# Patient Record
Sex: FEMALE | Race: OTHER | HISPANIC OR LATINO | URBAN - METROPOLITAN AREA
[De-identification: names, ages, dates, MRNs, and addresses within clinical notes are randomized per-mention and may not be internally consistent; named-entity substitution may affect disease eponyms.]

---

## 2018-01-07 ENCOUNTER — EMERGENCY (EMERGENCY)
Facility: HOSPITAL | Age: 68
LOS: 1 days | Discharge: HOME | End: 2018-01-07
Attending: EMERGENCY MEDICINE
Payer: COMMERCIAL

## 2018-01-07 VITALS
HEIGHT: 60 IN | WEIGHT: 125 LBS | RESPIRATION RATE: 20 BRPM | OXYGEN SATURATION: 95 % | SYSTOLIC BLOOD PRESSURE: 146 MMHG | TEMPERATURE: 99 F | HEART RATE: 72 BPM | DIASTOLIC BLOOD PRESSURE: 79 MMHG

## 2018-01-07 PROCEDURE — 99284 EMERGENCY DEPT VISIT MOD MDM: CPT

## 2018-01-07 RX ORDER — CLARITHROMYCIN 500 MG
1 TABLET ORAL
Qty: 1 | Refills: 0 | OUTPATIENT
Start: 2018-01-07

## 2018-01-07 RX ORDER — ALBUTEROL 90 UG/1
2 AEROSOL, METERED ORAL
Qty: 1 | Refills: 0 | OUTPATIENT
Start: 2018-01-07 | End: 2018-02-05

## 2018-01-07 NOTE — ED ADULT NURSE NOTE - OBJECTIVE STATEMENT
pt from home c/o of generalized bodyache with fever and cough x5 days pt is alert awake no acute respiratory distress noted

## 2021-09-30 PROBLEM — I10 ESSENTIAL (PRIMARY) HYPERTENSION: Chronic | Status: ACTIVE | Noted: 2018-01-07

## 2021-10-01 ENCOUNTER — APPOINTMENT (OUTPATIENT)
Dept: GASTROENTEROLOGY | Facility: CLINIC | Age: 71
End: 2021-10-01
Payer: MEDICARE

## 2021-10-01 VITALS
HEART RATE: 76 BPM | TEMPERATURE: 97.3 F | WEIGHT: 107 LBS | HEIGHT: 58 IN | BODY MASS INDEX: 22.46 KG/M2 | SYSTOLIC BLOOD PRESSURE: 131 MMHG | DIASTOLIC BLOOD PRESSURE: 74 MMHG | OXYGEN SATURATION: 97 %

## 2021-10-01 DIAGNOSIS — R10.13 EPIGASTRIC PAIN: ICD-10-CM

## 2021-10-01 DIAGNOSIS — Z12.11 ENCOUNTER FOR SCREENING FOR MALIGNANT NEOPLASM OF COLON: ICD-10-CM

## 2021-10-01 DIAGNOSIS — R63.4 ABNORMAL WEIGHT LOSS: ICD-10-CM

## 2021-10-01 DIAGNOSIS — Z01.818 ENCOUNTER FOR OTHER PREPROCEDURAL EXAMINATION: ICD-10-CM

## 2021-10-01 PROBLEM — Z00.00 ENCOUNTER FOR PREVENTIVE HEALTH EXAMINATION: Status: ACTIVE | Noted: 2021-10-01

## 2021-10-01 PROCEDURE — 99204 OFFICE O/P NEW MOD 45 MIN: CPT

## 2021-10-01 RX ORDER — POLYETHYLENE GLYCOL 3350 AND ELECTROLYTES WITH LEMON FLAVOR 236; 22.74; 6.74; 5.86; 2.97 G/4L; G/4L; G/4L; G/4L; G/4L
236 POWDER, FOR SOLUTION ORAL
Qty: 1 | Refills: 0 | Status: ACTIVE | COMMUNITY
Start: 2021-10-01 | End: 1900-01-01

## 2021-10-01 NOTE — HISTORY OF PRESENT ILLNESS
[None] : had no significant interval events [Heartburn] : denies heartburn [Nausea] : denies nausea [Vomiting] : denies vomiting [Diarrhea] : denies diarrhea [Constipation] : denies constipation [Yellow Skin Or Eyes (Jaundice)] : denies jaundice [Abdominal Pain] : denies abdominal pain [Rectal Pain] : denies rectal pain [Wt Loss ___ Lbs] : recent [unfilled] ~Upound(s) weight loss [Abdominal Swelling] : abdominal swelling [Wt Gain ___ Lbs] : no recent weight gain [GERD] : no gastroesophageal reflux disease [Hiatus Hernia] : no hiatus hernia [Peptic Ulcer Disease] : no peptic ulcer disease [Pancreatitis] : no pancreatitis [Cholelithiasis] : no cholelithiasis [Kidney Stone] : no kidney stone [Inflammatory Bowel Disease] : no inflammatory bowel disease [Irritable Bowel Syndrome] : no irritable bowel syndrome [Diverticulitis] : no diverticulitis [Alcohol Abuse] : no alcohol abuse [Malignancy] : no malignancy [Abdominal Surgery] : no abdominal surgery [Appendectomy] : no appendectomy [Cholecystectomy] : no cholecystectomy [de-identified] : The patient is a 71-year-old  female with past medical history significant for hypertension and hypercholesterolemia who was referred to my office by Dr. Jose Graham for dyspepsia. The patient also admits to having recent weight loss.  The patient also admits to coming to the office for colon cancer screening.  I was asked to render an opinion for consultation for the above complaints.   The patient states that she is feeling fine.  The patient denies any abdominal pain.  The patient complains of abdominal gas and bloating.  The patient denies any nausea or vomiting.  The patient denies any gastroesophageal reflux disease or dysphagia. The patient denies any atypical chest pain, shortness of breath or palpitations.  The patient denies any diaphoresis. The patient denies any constipation or diarrhea.  The patient has 1 to 2 bowel movements a day.  The patient denies a change in bowel habits.  The patient denies a change in caliber of stool.  The patient denies having mucus discharge with the bowel movements.  The patient denies any bright red blood per rectum, melena or hematemesis.  The patient complains of rectal pruritus but denies any rectal pain.  The patient complains of weight loss but denies any anorexia.  The patient admits to losing 13 pounds over the past 1 year.  The patient attributes the weight loss to recent stress from COVID.  She denies any fevers or chills.  The patient denies any jaundice or pruritus.  The patient denies any back pain.  The patient admits to having a prior colonoscopy performed by another gastroenterologist.  According to the patient, the colonoscopic findings revealed a poor prep.   The patient's last menstrual period was age 46. The patient is a .  The patient's first menstrual period was at age 15. The patient denies any significant family history of GI problems.   [de-identified] : (-) smoking, (-) ETOH, (-) IVDA\par

## 2021-10-01 NOTE — REVIEW OF SYSTEMS
[Feeling Tired] : feeling tired [Eyesight Problems] : eyesight problems [Joint Swelling] : joint swelling [Joint Stiffness] : joint stiffness [Itching] : itching [Anxiety] : anxiety [Negative] : Heme/Lymph

## 2021-10-01 NOTE — ASSESSMENT
[FreeTextEntry1] : Dyspepsia: The patient complains of dyspeptic symptoms.  The patient was advised to abide by an anti-gas diet.  The patient was given a pamphlet for anti-gas.  The patient and I reviewed the anti-gas diet at length. The patient is to start on a trial of Phazyme one tablet 3 times a day p.r.n. abdominal pain and gas.\par Weight Loss: The patient complains of weight loss but denies any anorexia.  The patient admits to losing 13 pounds over the past 1 year.  The patient attributes the weight loss to recent stress from COVID. \par Colon Cancer screening: I recommend colonoscopy for colon cancer screening over the age of 45 to assess for colonic polyps. The patient was told of the risks and benefits of the procedure.  The patient was told of the risks of perforation, emergency surgery, bleeding, infections and missed lesions. The patient agreed and will schedule for the procedure. The patient is to be n.p.o. after midnight and bowel prep was given.  The patient is to return for the procedure. \par Colonoscopy: I recommend a colonoscopy to assess the symptoms.  The patient was told of the risks and benefits of the procedure.  The patient was told of the risks of perforation, emergency surgery, bleeding, infections and missed lesions.  The patient agreed and will schedule for the procedure. The patient can take the antihypertensive medication with a sip of water one hour prior to the procedure.  The patient is to be n.p.o. after midnight and bowel prep was given.  The patient is to return for the procedure. \par Follow-up: The patient is to follow-up in the office in 4 weeks to reassess the symptoms. The patient was told to call the office if any further problems. \par

## 2021-10-04 LAB — HEMOCCULT STL QL: NEGATIVE

## 2021-11-17 ENCOUNTER — APPOINTMENT (OUTPATIENT)
Dept: DISASTER EMERGENCY | Facility: CLINIC | Age: 71
End: 2021-11-17

## 2021-11-17 ENCOUNTER — LABORATORY RESULT (OUTPATIENT)
Age: 71
End: 2021-11-17

## 2021-11-20 ENCOUNTER — APPOINTMENT (OUTPATIENT)
Dept: DISASTER EMERGENCY | Facility: CLINIC | Age: 71
End: 2021-11-20

## 2021-11-20 DIAGNOSIS — Z01.818 ENCOUNTER FOR OTHER PREPROCEDURAL EXAMINATION: ICD-10-CM

## 2021-11-21 LAB — SARS-COV-2 N GENE NPH QL NAA+PROBE: NOT DETECTED

## 2021-11-23 ENCOUNTER — OUTPATIENT (OUTPATIENT)
Dept: OUTPATIENT SERVICES | Facility: HOSPITAL | Age: 71
LOS: 1 days | End: 2021-11-23
Payer: COMMERCIAL

## 2021-11-23 ENCOUNTER — APPOINTMENT (OUTPATIENT)
Dept: GASTROENTEROLOGY | Facility: HOSPITAL | Age: 71
End: 2021-11-23

## 2021-11-23 DIAGNOSIS — Z12.11 ENCOUNTER FOR SCREENING FOR MALIGNANT NEOPLASM OF COLON: ICD-10-CM

## 2021-11-23 PROCEDURE — G0121: CPT

## 2021-11-23 PROCEDURE — G0121 COLON CA SCRN NOT HI RSK IND: CPT

## 2024-09-05 ENCOUNTER — EMERGENCY (EMERGENCY)
Facility: HOSPITAL | Age: 74
LOS: 1 days | Discharge: HOME | End: 2024-09-05
Attending: EMERGENCY MEDICINE
Payer: COMMERCIAL

## 2024-09-05 VITALS
WEIGHT: 119.93 LBS | RESPIRATION RATE: 17 BRPM | TEMPERATURE: 98 F | DIASTOLIC BLOOD PRESSURE: 80 MMHG | HEIGHT: 62 IN | SYSTOLIC BLOOD PRESSURE: 139 MMHG | HEART RATE: 80 BPM | OXYGEN SATURATION: 97 %

## 2024-09-05 DIAGNOSIS — Z98.891 HISTORY OF UTERINE SCAR FROM PREVIOUS SURGERY: Chronic | ICD-10-CM

## 2024-09-05 LAB
ALBUMIN SERPL ELPH-MCNC: 3.8 G/DL — SIGNIFICANT CHANGE UP (ref 3.5–5)
ALP SERPL-CCNC: 144 U/L — HIGH (ref 40–120)
ALT FLD-CCNC: 38 U/L DA — SIGNIFICANT CHANGE UP (ref 10–60)
ANION GAP SERPL CALC-SCNC: 8 MMOL/L — SIGNIFICANT CHANGE UP (ref 5–17)
APPEARANCE UR: CLEAR — SIGNIFICANT CHANGE UP
AST SERPL-CCNC: 42 U/L — HIGH (ref 10–40)
BACTERIA # UR AUTO: ABNORMAL /HPF
BASOPHILS # BLD AUTO: 0.04 K/UL — SIGNIFICANT CHANGE UP (ref 0–0.2)
BASOPHILS NFR BLD AUTO: 0.7 % — SIGNIFICANT CHANGE UP (ref 0–2)
BILIRUB SERPL-MCNC: 0.7 MG/DL — SIGNIFICANT CHANGE UP (ref 0.2–1.2)
BILIRUB UR-MCNC: NEGATIVE — SIGNIFICANT CHANGE UP
BUN SERPL-MCNC: 19 MG/DL — HIGH (ref 7–18)
CALCIUM SERPL-MCNC: 10 MG/DL — SIGNIFICANT CHANGE UP (ref 8.4–10.5)
CHLORIDE SERPL-SCNC: 98 MMOL/L — SIGNIFICANT CHANGE UP (ref 96–108)
CO2 SERPL-SCNC: 27 MMOL/L — SIGNIFICANT CHANGE UP (ref 22–31)
COLOR SPEC: YELLOW — SIGNIFICANT CHANGE UP
COMMENT - URINE: SIGNIFICANT CHANGE UP
CREAT SERPL-MCNC: 0.83 MG/DL — SIGNIFICANT CHANGE UP (ref 0.5–1.3)
DIFF PNL FLD: NEGATIVE — SIGNIFICANT CHANGE UP
EGFR: 74 ML/MIN/1.73M2 — SIGNIFICANT CHANGE UP
EOSINOPHIL # BLD AUTO: 0.19 K/UL — SIGNIFICANT CHANGE UP (ref 0–0.5)
EOSINOPHIL NFR BLD AUTO: 3.4 % — SIGNIFICANT CHANGE UP (ref 0–6)
EPI CELLS # UR: PRESENT
GLUCOSE SERPL-MCNC: 144 MG/DL — HIGH (ref 70–99)
GLUCOSE UR QL: NEGATIVE MG/DL — SIGNIFICANT CHANGE UP
HCT VFR BLD CALC: 34.7 % — SIGNIFICANT CHANGE UP (ref 34.5–45)
HGB BLD-MCNC: 12 G/DL — SIGNIFICANT CHANGE UP (ref 11.5–15.5)
HIV 1 & 2 AB SERPL IA.RAPID: SIGNIFICANT CHANGE UP
IMM GRANULOCYTES NFR BLD AUTO: 2 % — HIGH (ref 0–0.9)
KETONES UR-MCNC: NEGATIVE MG/DL — SIGNIFICANT CHANGE UP
LEUKOCYTE ESTERASE UR-ACNC: ABNORMAL
LIDOCAIN IGE QN: 29 U/L — SIGNIFICANT CHANGE UP (ref 13–75)
LYMPHOCYTES # BLD AUTO: 1.93 K/UL — SIGNIFICANT CHANGE UP (ref 1–3.3)
LYMPHOCYTES # BLD AUTO: 34.9 % — SIGNIFICANT CHANGE UP (ref 13–44)
MAGNESIUM SERPL-MCNC: 1.9 MG/DL — SIGNIFICANT CHANGE UP (ref 1.6–2.6)
MCHC RBC-ENTMCNC: 32.3 PG — SIGNIFICANT CHANGE UP (ref 27–34)
MCHC RBC-ENTMCNC: 34.6 GM/DL — SIGNIFICANT CHANGE UP (ref 32–36)
MCV RBC AUTO: 93.3 FL — SIGNIFICANT CHANGE UP (ref 80–100)
MONOCYTES # BLD AUTO: 0.69 K/UL — SIGNIFICANT CHANGE UP (ref 0–0.9)
MONOCYTES NFR BLD AUTO: 12.5 % — SIGNIFICANT CHANGE UP (ref 2–14)
NEUTROPHILS # BLD AUTO: 2.57 K/UL — SIGNIFICANT CHANGE UP (ref 1.8–7.4)
NEUTROPHILS NFR BLD AUTO: 46.5 % — SIGNIFICANT CHANGE UP (ref 43–77)
NITRITE UR-MCNC: NEGATIVE — SIGNIFICANT CHANGE UP
NRBC # BLD: 0 /100 WBCS — SIGNIFICANT CHANGE UP (ref 0–0)
PH UR: 8 — SIGNIFICANT CHANGE UP (ref 5–8)
PHOSPHATE SERPL-MCNC: 2.6 MG/DL — SIGNIFICANT CHANGE UP (ref 2.5–4.5)
PLATELET # BLD AUTO: 146 K/UL — LOW (ref 150–400)
POTASSIUM SERPL-MCNC: 3.7 MMOL/L — SIGNIFICANT CHANGE UP (ref 3.5–5.3)
POTASSIUM SERPL-SCNC: 3.7 MMOL/L — SIGNIFICANT CHANGE UP (ref 3.5–5.3)
PROT SERPL-MCNC: 7 G/DL — SIGNIFICANT CHANGE UP (ref 6–8.3)
PROT UR-MCNC: NEGATIVE MG/DL — SIGNIFICANT CHANGE UP
RBC # BLD: 3.72 M/UL — LOW (ref 3.8–5.2)
RBC # FLD: 12.8 % — SIGNIFICANT CHANGE UP (ref 10.3–14.5)
RBC CASTS # UR COMP ASSIST: 1 /HPF — SIGNIFICANT CHANGE UP (ref 0–4)
SODIUM SERPL-SCNC: 133 MMOL/L — LOW (ref 135–145)
SP GR SPEC: 1.01 — SIGNIFICANT CHANGE UP (ref 1–1.03)
TROPONIN I, HIGH SENSITIVITY RESULT: 48.7 NG/L — SIGNIFICANT CHANGE UP
UROBILINOGEN FLD QL: 1 MG/DL — SIGNIFICANT CHANGE UP (ref 0.2–1)
WBC # BLD: 5.53 K/UL — SIGNIFICANT CHANGE UP (ref 3.8–10.5)
WBC # FLD AUTO: 5.53 K/UL — SIGNIFICANT CHANGE UP (ref 3.8–10.5)
WBC UR QL: 6 /HPF — HIGH (ref 0–5)

## 2024-09-05 PROCEDURE — 81001 URINALYSIS AUTO W/SCOPE: CPT

## 2024-09-05 PROCEDURE — 83690 ASSAY OF LIPASE: CPT

## 2024-09-05 PROCEDURE — 93010 ELECTROCARDIOGRAM REPORT: CPT | Mod: 76

## 2024-09-05 PROCEDURE — 84484 ASSAY OF TROPONIN QUANT: CPT

## 2024-09-05 PROCEDURE — 99285 EMERGENCY DEPT VISIT HI MDM: CPT

## 2024-09-05 PROCEDURE — 86803 HEPATITIS C AB TEST: CPT

## 2024-09-05 PROCEDURE — 85025 COMPLETE CBC W/AUTO DIFF WBC: CPT

## 2024-09-05 PROCEDURE — 96374 THER/PROPH/DIAG INJ IV PUSH: CPT

## 2024-09-05 PROCEDURE — 80053 COMPREHEN METABOLIC PANEL: CPT

## 2024-09-05 PROCEDURE — 99284 EMERGENCY DEPT VISIT MOD MDM: CPT | Mod: 25

## 2024-09-05 PROCEDURE — 36415 COLL VENOUS BLD VENIPUNCTURE: CPT

## 2024-09-05 PROCEDURE — 83735 ASSAY OF MAGNESIUM: CPT

## 2024-09-05 PROCEDURE — T1013: CPT

## 2024-09-05 PROCEDURE — 93005 ELECTROCARDIOGRAM TRACING: CPT

## 2024-09-05 PROCEDURE — 84100 ASSAY OF PHOSPHORUS: CPT

## 2024-09-05 PROCEDURE — 86703 HIV-1/HIV-2 1 RESULT ANTBDY: CPT

## 2024-09-05 RX ORDER — IBUPROFEN 600 MG
1 TABLET ORAL
Qty: 20 | Refills: 0
Start: 2024-09-05 | End: 2024-09-09

## 2024-09-05 RX ORDER — KETOROLAC TROMETHAMINE 30 MG/ML
15 INJECTION, SOLUTION INTRAMUSCULAR ONCE
Refills: 0 | Status: DISCONTINUED | OUTPATIENT
Start: 2024-09-05 | End: 2024-09-05

## 2024-09-05 RX ORDER — LIDOCAINE/BENZALKONIUM/ALCOHOL
1 SOLUTION, NON-ORAL TOPICAL
Qty: 20 | Refills: 0
Start: 2024-09-05 | End: 2024-09-09

## 2024-09-05 RX ORDER — CYCLOBENZAPRINE HCL 10 MG
1 TABLET ORAL
Qty: 20 | Refills: 0
Start: 2024-09-05 | End: 2024-09-09

## 2024-09-05 RX ORDER — CYCLOBENZAPRINE HCL 10 MG
10 TABLET ORAL ONCE
Refills: 0 | Status: COMPLETED | OUTPATIENT
Start: 2024-09-05 | End: 2024-09-05

## 2024-09-05 RX ADMIN — Medication 10 MILLIGRAM(S): at 21:35

## 2024-09-05 RX ADMIN — Medication 10 MILLIGRAM(S): at 20:05

## 2024-09-05 RX ADMIN — KETOROLAC TROMETHAMINE 15 MILLIGRAM(S): 30 INJECTION, SOLUTION INTRAMUSCULAR at 20:06

## 2024-09-05 NOTE — ED PROVIDER NOTE - OBJECTIVE STATEMENT
74 female with hx of HTN.   Patient presenting to the ED reporting diffuse back pains for the past 3 days.  Patient reports pain started after doing a lot of housework.  No trauma.

## 2024-09-05 NOTE — ED PROVIDER NOTE - NS ED ROS FT
Constitutional: (-) fever (-) chills  HENT: (-) congestion (-) rhinorrhea (-) sore throat  Eyes: (-) pain (-) redness  Respiratory: (-) cough (-) shortness of breath (-) wheezing (-) stridor  Cardiovascular: (-) chest pain (-) palpitations (-) leg swelling  Gastrointestinal: (-) abdominal pain (-) blood in stool (no melena/hematochezia) (-) diarrhea (-) vomiting  Genitourinary: (-) dysuria (-) hematuria (-) incontinence/retention   Musculoskeletal: (-) gait problem (-) joint swelling (-) myalgias (+) diffuse back pain  Skin: (-) color change (-) rash  Neurological: (-) weakness (-) numbness (-) headaches (-) saddle anesthesia   Psychiatric/Behavioral: (-) confusion

## 2024-09-05 NOTE — ED PROVIDER NOTE - PROGRESS NOTE DETAILS
Results reviewed.  Patient reports feeling better.  Nonfocal neuro. Stable gait.  Patient/Family advised regarding symptomatic/supportive care, importance of outpatient follow up, and symptoms to prompt ED return.

## 2024-09-05 NOTE — ED PROVIDER NOTE - PATIENT PORTAL LINK FT
You can access the FollowMyHealth Patient Portal offered by [facility] by registering at the following website: [email or web address]. By joining [facility]’s FollowMyHealth portal, you will also be able to view your health information using other applications (apps) compatible with our system.

## 2024-09-05 NOTE — ED PROVIDER NOTE - NSFOLLOWUPINSTRUCTIONS_ED_ALL_ED_FT
Please follow up with your PMD or Medicine Clinic in 2-3 days.  Return to the ER for worsening or concerning symptoms.  Your results for testing will be available in the Follow My Health application which can be downloaded to your phone or checked online on a computer.  [email or web address].  Take Acetaminophen (Tylenol) 650mg every 6 hours as needed for pain or fever.  Take Ibuprofen (Advil or Motrin) 400mg every 6 hours as needed for pain or fever with food.  Take Cyclobenzaprine (Flexeril) 10mg every 8 hours as needed for pain/muscle spasm. This medication can be sedating. Do not mix sedatives, drink alcohol, or operative car/heavy machinery when using this medication.  - - - - - - - - - - - - -  Por favor, consulte con alcaraz médico de cabecera o con alcaraz clínica de medicina en 2-3 días.  Regrese a la jaxon de emergencias si los síntomas empeoran o son preocupantes.  Los resultados de angelica pruebas estarán disponibles en la aplicación [employer], que puede descargarse a alcaraz teléfono o consultarse en línea en toshia computadora.  [email or web address].  Axis acetaminofeno (Tylenol) 650 mg cada 6 horas según sea necesario para el dolor o la fiebre.  Axis ibuprofeno (Advil o Motrin) 400 mg cada 6 horas según sea necesario para el dolor o la fiebre con alimentos.  Axis ciclobenzaprina (Flexeril) 10 mg cada 8 horas según sea necesario para el dolor/espasmo muscular. Rochelle medicamento puede ser sedante. No mezcle sedantes, no mariaelena alcohol ni conduzca un automóvil o maquinaria pesada cuando use rochelle medicamento.  - - - - - - - - - - - - -  Dolor de espalda ramón en los adultos  Acute Back Pain, Adult  El dolor de espalda ramón es repentino y por lo general no dura mucho tiempo. Se debe generalmente a toshia lesión de los músculos y tejidos de la espalda. La lesión puede ser el resultado de:  Estiramiento en exceso o desgarro de un músculo, tendón o ligamento. Los ligamentos son tejidos que conectan los huesos. Levantar algo de forma incorrecta puede producir un esguince de espalda.  Desgaste (degeneración) de los discos vertebrales. Los discos vertebrales son tejidos circulares que proporcionan amortiguación entre los huesos de la columna vertebral (vértebras).  Movimientos de giro, kaylee al practicar deportes o realizar trabajos de jardinería.  Un golpe en la espalda.  Artritis.  Es posible que le realicen un examen físico, análisis de laboratorio u otros estudios de diagnóstico por imágenes para encontrar la causa del dolor. El dolor de espalda ramón generalmente desaparece con reposo y cuidados en la casa.    Siga estas instrucciones en alcaraz casa:  Control del dolor, la rigidez y la hinchazón    Use los medicamentos de venta gumaro y los recetados solamente kaylee se lo haya indicado el médico. El tratamiento puede incluir medicamentos para el dolor y la inflamación que se natacha por la boca o que se aplican sobre la piel, o relajantes musculares.  El médico puede recomendarle que se aplique hielo rtana las primeras 24 a 48 horas después del comienzo del dolor. Para hacer esto:  Ponga el hielo en toshia bolsa plástica.  Coloque toshia toalla entre la piel y la bolsa.  Aplique el hielo ratna 20 minutos, 2 o 3 veces por día.  Retire el hielo si la piel se pone de color garcia brillante. [place] es muy importante. Si no puede sentir dolor, calor o frío, tiene un mayor riesgo de que se dañe la jerardo.  Si se lo indican, aplique calor en la jerardo afectada con la frecuencia que le haya indicado el médico. Use la katelin de calor que el médico le recomiende, kaylee toshia compresa de calor húmedo o toshia almohadilla térmica.  Coloque toshia toalla entre la piel y la katelin de calor.  Aplique calor ratna 20 a 30 minutos.  Retire la katelin de calor si la piel se pone de color garcia brillante. [place] es especialmente importante si no puede sentir dolor, calor o frío. Corre un mayor riesgo de sufrir quemaduras.  Actividad    Comparisons of good and bad posture while driving, standing, sitting at a desk, and lifting heavy objects.  No permanezca en la cama. Hacer reposo en la cama por más de 1 a 2 días puede demorar alcaraz recuperación.  Mantenga toshia buena postura al sentarse y pararse. No se incline hacia adelante al sentarse ni se encorve al pararse.  Si trabaja en un escritorio, siéntese cerca de rochelle para no tener que inclinarse. Mantenga el mentón hacia abajo. Mantenga el mimi hacia atrás y los codos flexionados en un ángulo de 90 grados (ángulo recto).  Cuando conduzca, siéntese elevado y cerca del volante. Agregue un apoyo para la espalda (lumbar) al asiento del automóvil, si es necesario.  Realice caminatas cortas en superficies jaime tan pronto kaylee le sea posible. Trate de caminar un poco más de tiempo cada día.  No se siente, conduzca o permanezca de pie en un mismo lugar ratna más de 30 minutos seguidos. Pararse o sentarse ratna largos períodos de tiempo puede sobrecargar la espalda.  No conduzca ni use maquinaria pesada mientras zully analgésicos recetados.  Use técnicas apropiadas para levantar objetos. Cuando se inclina y levanta un objeto, utilice posiciones que no sobrecarguen tanto la espalda:  Flexione las rodillas.  Mantenga la carga cerca del cuerpo.  No se tuerza.  Amador actividad física habitualmente kaylee se lo haya indicado el médico. Hacer ejercicios ayuda a que la espalda sane más rápido y ayuda a evitar las lesiones de la espalda al mantener los músculos claritza y flexibles.  Trabaje con un fisioterapeuta para crear un programa de ejercicios seguros, según lo recomiende el médico. Amador ejercicios kaylee se lo haya indicado el fisioterapeuta.  Estilo de elijah    Mantenga un peso saludable. El sobrepeso sobrecarga la espalda y hace que resulte difícil tener toshia buena postura.  Evite actividades o situaciones que lo deanna sentirse ansioso o estresado. El estrés y la ansiedad aumentan la tensión muscular y pueden empeorar el dolor de espalda. Aprenda formas de manejar la ansiedad y el estrés, kaylee a través del ejercicio.  Instrucciones generales    Duerma sobre un colchón firme en toshia posición cómoda. Intente acostarse de costado, con las rodillas ligeramente flexionadas. Si se recuesta sobre la espalda, coloque toshia almohada debajo de las rodillas.  Mantenga la kiran y el mimi en línea recta con la columna vertebral (posición neutra) cuando use equipos electrónicos kaylee teléfonos inteligentes o tablets. Para hacer esto:  Levante el teléfono inteligente o la tablet para mirarlo en lugar de inclinar la kiran o el mimi para mirar hacia abajo.  Coloque el teléfono inteligente o la tablet al nivel de alcaraz bertin mientras chris la pantalla.  Siga el plan de tratamiento kaylee se lo haya indicado el médico. Crossgate puede incluir:  Terapia cognitiva o conductual.  Acupuntura o terapia de masajes.  Yoga o meditación.  Comuníquese con un médico si:  Siente un dolor que no se amalia con reposo o medicamentos.  Siente mucho dolor que se extiende a las piernas o las nalgas.  El dolor no mejora luego de 2 semanas.  Siente dolor por la noche.  Pierde peso sin proponérselo.  Tiene fiebre o escalofríos.  Siente náuseas o vómitos.  Siente dolor abdominal.  Solicite ayuda de inmediato si:  Tiene nuevos problemas para controlar la vejiga o los intestinos.  Siente debilidad o adormecimiento inusuales en los brazos o en las piernas.  Siente que va a desmayarse.  Estos síntomas pueden representar un problema grave que constituye toshia emergencia. No espere a myles si los síntomas desaparecen. Solicite atención médica de inmediato. Comuníquese con el servicio de emergencias de alcaraz localidad (911 en los [place]). No conduzca por angelica propios medios hasta el hospital.    Resumen  El dolor de espalda ramón es repentino y por lo general no dura mucho tiempo.  Use técnicas apropiadas para levantar objetos. Cuando se inclina y levanta un objeto, utilice posiciones que no sobrecarguen tanto la espalda.  Axis los medicamentos de venta gumaro y los recetados solamente kaylee se lo haya indicado el médico, y aplíquese calor o hielo según las indicaciones.  Esta información no tiene kaylee fin reemplazar el consejo del médico. Asegúrese de hacerle al médico cualquier pregunta que tenga.

## 2024-09-05 NOTE — ED PROVIDER NOTE - PHYSICAL EXAMINATION
Gen:  Awake, alert, NAD, WDWN, NCAT, non-toxic appearing.  Eyes:  PERRL, EOMI, no icterus, normal lids/lashes, normal conjunctivae.  ENT:  External inspection normal, pink/moist membranes.   CV:  S1S2, regular rate and rhythm, no murmur/gallops/rubs, no JVD, 2+ pulses b/l, no edema/cords/homans, good capillary refill, warm/well-perfused.  Resp:  Normal respiratory rate/effort, no respiratory distress, normal voice, speaking full sentences, lungs clear to auscultation b/l, no wheezing/rales/rhonchi, no retractions, no stridor.  Abd:  Soft abdomen, no tender/distended/guarding/rebound, no pulsatile mass, no CVA tender.   Musculoskeletal:  N/V intact, FROM all 4 extremities, normal motor tone, stable gait. No TLS spinal tender/deformity/step-offs, diffuse paraspinal muscle spasm/tender.  Neck:  FROM neck, supple, trachea midline, no meningismus. No C-spine tender/deformity/step-offs.   Skin:  Color normal for race, warm and dry, no rash.  Neuro:  Oriented x3, CN 2-12 intact, normal motor, normal sensory, no saddle anesthesia, normal strength/sensation including distal toes b/l, normal gait. GCS 15  Psych:  Attention normal. Affect normal. Behavior normal. Judgment normal.

## 2024-09-05 NOTE — ED PROVIDER NOTE - CLINICAL SUMMARY MEDICAL DECISION MAKING FREE TEXT BOX
74 female with hx of HTN.   Patient presenting to the ED reporting diffuse back pains for the past 3 days.  Patient reports pain started after doing a lot of housework.  No trauma.    Vitals stable.  Nontoxic appearing, n/v intact.  Airway intact, no respiratory distress, no hypoxia.  No abdominal or CVA tenderness.  Nonfocal neuro.  Low suspicion of spinal cord/cord injury.    Plan to obtain:  -Labs, EKG, analgesia as needed, observe/reassess    ED Course:  Lab values demonstrate no acute/emergent pathology.  My independent interpretation of the EKG: Sinus @81, normal axis, normal intervals, normal ST/T  Analgesia given with improvement symptoms.  Patient advised regarding need for close outpatient follow up.  Patient stable for further care in outpatient setting. No indication for inpatient admission at this time. Patient advised regarding symptomatic & supportive care and symptoms to prompt ED return. Strict return precautions provided.    : [id number]

## 2024-09-05 NOTE — ED PROVIDER NOTE - NSFOLLOWUPCLINICS_GEN_ALL_ED_FT
[facility]  Internal Medicine  [address] [place] [address]  Phone: [phone]  Fax: [phone]  Follow Up Time: 1-3 Days

## 2024-09-06 LAB
HCV AB S/CO SERPL IA: 0.14 S/CO — SIGNIFICANT CHANGE UP (ref 0–0.99)
HCV AB SERPL-IMP: SIGNIFICANT CHANGE UP

## 2024-12-23 NOTE — ED ADULT NURSE NOTE - CAS EDN DISCHARGE INTERVENTIONS
----- Message from Magdalena Monk MD sent at 12/19/2024  1:12 PM CST -----  Please call patient. She has not read their [employer] message   
Called patient and let her know her replaced knee is stable. Patient verbalized understanding.  
IV discontinued, cath removed intact